# Patient Record
(demographics unavailable — no encounter records)

---

## 2025-03-04 NOTE — PHYSICAL EXAM
[FreeTextEntry1] : NEURO: Mental Status Oriented to person, place, time, and situation Speech is clear, fluent, and spontaneous. Comprehension and memory intact  Cranial Nerves Visual fields full to confrontation Pupils equal, round. Extraocular movements intact. Facial sensation intact and symmetric Facial movement intact and symmetric Hearing grossly intact Bilateral shoulder shrug intact Tongue midline, no tongue bite sign or deviation on protrusion  Sensation Light touch intact, symmetric  Motor Tone and bulk intact moving all extremities spontaneously, strength symmetric throughout  Coordination FTN intact  Gait intact  GEN: awake, alert, interactive, no acute distress EYES: sclera white, conjunctiva clear, no redness or discharge ENT: normal appearing outer ears, hearing grossly intact PULM: normal respiratory rhythm and effort, speaking in full sentences without distress, no accessory muscle usage.
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
## 2025-03-04 NOTE — ASSESSMENT
[FreeTextEntry1] : 31 y/o F presenting today for f/u. Since last visit, patient was doing well with 1 HA/month until last month when she had change in aura characteristics and migraine frequency.  MRI brain 5/19/2023 without acute pathology including hemorrhage, infarction, fluid collection, or hydrocephalus Lab workup - vitamin D 29.8 L  Given change in typical HA we will repeat MRI. We will trial CGRPi samples.   Recommendations: - MRI brain IACs w/w/o to evaluate for structural pathology or IAC pathology- will call with results.  - continue OTC magnesium 400 mg daily, for migraine preventive - stop sumatriptan - samples of Ubrelvy 100 mg and Nurtec 75 mg given - education and counseling provided- pt to call office and let us know which rescue medication works better so we can send a script - samples of Qulipta 60 mg given - education and counseling provided - pt to trial this for menstrual migraines, start 2 days before expected period for total of 8 days - consider starting as preventive if good effect. - lifestyle modifications discussed in detail - sleep hygiene, regular physical activity, proper hydration, stress reduction, HA journal and trigger avoidance  Patient to return to office as needed. She is establishing care with [place] neurology in June. Patient understands to seek urgent medical evaluation for any new or worsening symptoms.
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
## 2025-05-14 NOTE — ASSESSMENT
[FreeTextEntry1] : 29 y/o F presenting today for f/u. Since last visit, significant migraine burden and accompanying anxiety. She noticed anisocoria L>R pupil during migraine syndrome. Did not try CGRPi samples. Imaging work up at [facility] unremarkable.   MRI BRAIN 3/05/25 - Few scattered punctate foci of FLAIR hyperintensities in the subcortical white matter are nonspecific but are most likely due to a vasospastic phenomenon in the setting of migraines. No retrocochlear mass. Paranasal sinus inflammatory mucosal disease as above. CXR 5/13/25 - no acute cardiopulmonary process. No apical lung mass radiographically suggested. MRA H/N 5/13/25 - unremarkable, no dissection, aneurysm, marked stenosis, or AVM seen. MRI brain 5/13/25 - no intracranial hemorrhage, infarct, abnormal enhancement, mass effect, edema, midline shift. Pansinus mucosal disease.  Pt with chronic migraine with visual aura - given her HA burden, I discussed with pt and mom that she would benefit from preventive and abortive Rx therapy.   Recommendations: - start Ubrelvy for migraine abortive - samples of Ubrelvy 50 mg and 100 mg given - education and counseling provided- sent to [place] pharmacy per pt request - start Qulipta for migraine preventive samples of Qulipta 30 mg and 60 mg given - education and counseling provided - sent to [place] pharmacy per pt request - continue OTC magnesium 400 mg daily, for migraine preventive - agree with ophthalmology referral given by [facility] - f/u with PCP re: equivocal Lyme b/w - vestibular therapy referral for vertigo - work letter given - lifestyle modifications discussed in detail - sleep hygiene, regular physical activity, proper hydration, stress reduction, HA journal and trigger avoidance  Patient to return to office as needed. She is establishing care with [place] neurology in June. Patient understands to seek urgent medical evaluation for any new or worsening symptoms.

## 2025-05-14 NOTE — PHYSICAL EXAM
[FreeTextEntry1] : NEURO: Mental Status Oriented to person, place, time, and situation Speech is clear, fluent, and spontaneous. Comprehension and memory intact  Cranial Nerves Visual fields full to confrontation Pupils equal, round. No anisocoria or ptosis noted. Extraocular movements intact. Facial sensation intact and symmetric Facial movement intact and symmetric Hearing grossly intact Bilateral shoulder shrug intact Tongue midline, no tongue bite sign or deviation on protrusion  Sensation Light touch intact, symmetric  Motor Tone and bulk intact moving all extremities spontaneously, strength symmetric throughout  Coordination FTN intact  Gait intact  GEN: awake, alert, interactive, no acute distress EYES: sclera white, conjunctiva clear, no redness or discharge ENT: normal appearing outer ears, hearing grossly intact PULM: normal respiratory rhythm and effort, speaking in full sentences without distress, no accessory muscle usage.